# Patient Record
Sex: FEMALE | NOT HISPANIC OR LATINO
[De-identification: names, ages, dates, MRNs, and addresses within clinical notes are randomized per-mention and may not be internally consistent; named-entity substitution may affect disease eponyms.]

---

## 2017-06-14 PROBLEM — Z00.00 ENCOUNTER FOR PREVENTIVE HEALTH EXAMINATION: Status: ACTIVE | Noted: 2017-06-14

## 2017-06-19 ENCOUNTER — FORM ENCOUNTER (OUTPATIENT)
Age: 62
End: 2017-06-19

## 2017-06-20 ENCOUNTER — APPOINTMENT (OUTPATIENT)
Dept: RHEUMATOLOGY | Facility: CLINIC | Age: 62
End: 2017-06-20

## 2017-06-20 ENCOUNTER — OUTPATIENT (OUTPATIENT)
Dept: OUTPATIENT SERVICES | Facility: HOSPITAL | Age: 62
LOS: 1 days | End: 2017-06-20
Payer: COMMERCIAL

## 2017-06-20 ENCOUNTER — APPOINTMENT (OUTPATIENT)
Dept: RADIOLOGY | Facility: CLINIC | Age: 62
End: 2017-06-20

## 2017-06-20 ENCOUNTER — APPOINTMENT (OUTPATIENT)
Dept: ENDOCRINOLOGY | Facility: CLINIC | Age: 62
End: 2017-06-20

## 2017-06-20 VITALS
HEIGHT: 66 IN | TEMPERATURE: 97.9 F | HEART RATE: 97 BPM | BODY MASS INDEX: 22.5 KG/M2 | DIASTOLIC BLOOD PRESSURE: 72 MMHG | SYSTOLIC BLOOD PRESSURE: 111 MMHG | WEIGHT: 140 LBS | OXYGEN SATURATION: 98 %

## 2017-06-20 DIAGNOSIS — R29.890 LOSS OF HEIGHT: ICD-10-CM

## 2017-06-20 DIAGNOSIS — Z82.61 FAMILY HISTORY OF ARTHRITIS: ICD-10-CM

## 2017-06-20 DIAGNOSIS — Z98.890 OTHER SPECIFIED POSTPROCEDURAL STATES: ICD-10-CM

## 2017-06-20 DIAGNOSIS — M25.50 PAIN IN UNSPECIFIED JOINT: ICD-10-CM

## 2017-06-20 PROCEDURE — 73564 X-RAY EXAM KNEE 4 OR MORE: CPT

## 2017-06-20 PROCEDURE — 72070 X-RAY EXAM THORAC SPINE 2VWS: CPT

## 2017-06-27 PROBLEM — Z98.890 HISTORY OF PELVIC SURGERY: Status: RESOLVED | Noted: 2017-06-20 | Resolved: 2017-06-27

## 2017-06-27 PROBLEM — Z82.61 FAMILY HISTORY OF ARTHRITIS: Status: ACTIVE | Noted: 2017-06-20

## 2017-07-11 ENCOUNTER — APPOINTMENT (OUTPATIENT)
Dept: RHEUMATOLOGY | Facility: CLINIC | Age: 62
End: 2017-07-11

## 2017-07-11 VITALS
BODY MASS INDEX: 24.98 KG/M2 | SYSTOLIC BLOOD PRESSURE: 110 MMHG | WEIGHT: 141 LBS | HEIGHT: 63 IN | TEMPERATURE: 97.8 F | OXYGEN SATURATION: 93 % | DIASTOLIC BLOOD PRESSURE: 70 MMHG | HEART RATE: 79 BPM

## 2017-07-25 LAB
25(OH)D3 SERPL-MCNC: 34 NG/ML
ALBUMIN SERPL ELPH-MCNC: 4.2 G/DL
ALP BLD-CCNC: 52 U/L
ALT SERPL-CCNC: 17 U/L
ANION GAP SERPL CALC-SCNC: 15 MMOL/L
AST SERPL-CCNC: 18 U/L
BASOPHILS # BLD AUTO: 0.01 K/UL
BASOPHILS NFR BLD AUTO: 0.2 %
BILIRUB SERPL-MCNC: 0.2 MG/DL
BUN SERPL-MCNC: 13 MG/DL
CALCIUM SERPL-MCNC: 9.3 MG/DL
CHLORIDE SERPL-SCNC: 104 MMOL/L
CO2 SERPL-SCNC: 24 MMOL/L
CREAT SERPL-MCNC: 0.56 MG/DL
EOSINOPHIL # BLD AUTO: 0.06 K/UL
EOSINOPHIL NFR BLD AUTO: 0.9 %
GLUCOSE SERPL-MCNC: 85 MG/DL
HBA1C MFR BLD HPLC: 5.4 %
HCT VFR BLD CALC: 38.5 %
HGB BLD-MCNC: 12.4 G/DL
IMM GRANULOCYTES NFR BLD AUTO: 0.2 %
LYMPHOCYTES # BLD AUTO: 1.9 K/UL
LYMPHOCYTES NFR BLD AUTO: 29.1 %
MAGNESIUM SERPL-MCNC: 2.3 MG/DL
MAN DIFF?: NORMAL
MCHC RBC-ENTMCNC: 29.2 PG
MCHC RBC-ENTMCNC: 32.2 GM/DL
MCV RBC AUTO: 90.8 FL
MONOCYTES # BLD AUTO: 0.37 K/UL
MONOCYTES NFR BLD AUTO: 5.7 %
NEUTROPHILS # BLD AUTO: 4.17 K/UL
NEUTROPHILS NFR BLD AUTO: 63.9 %
PHOSPHATE SERPL-MCNC: 3.9 MG/DL
PLATELET # BLD AUTO: 207 K/UL
POTASSIUM SERPL-SCNC: 4.3 MMOL/L
PROT SERPL-MCNC: 7.3 G/DL
RBC # BLD: 4.24 M/UL
RBC # FLD: 12.8 %
SODIUM SERPL-SCNC: 143 MMOL/L
WBC # FLD AUTO: 6.52 K/UL

## 2017-07-26 ENCOUNTER — OUTPATIENT (OUTPATIENT)
Dept: OUTPATIENT SERVICES | Facility: HOSPITAL | Age: 62
LOS: 1 days | End: 2017-07-26
Payer: COMMERCIAL

## 2017-07-26 ENCOUNTER — APPOINTMENT (OUTPATIENT)
Dept: RADIOLOGY | Facility: IMAGING CENTER | Age: 62
End: 2017-07-26

## 2017-07-26 DIAGNOSIS — Z00.8 ENCOUNTER FOR OTHER GENERAL EXAMINATION: ICD-10-CM

## 2017-07-26 PROCEDURE — 71046 X-RAY EXAM CHEST 2 VIEWS: CPT

## 2017-07-26 PROCEDURE — 71020: CPT | Mod: 26

## 2017-10-18 ENCOUNTER — APPOINTMENT (OUTPATIENT)
Dept: VASCULAR SURGERY | Facility: CLINIC | Age: 62
End: 2017-10-18

## 2018-01-11 ENCOUNTER — APPOINTMENT (OUTPATIENT)
Dept: DERMATOLOGY | Facility: CLINIC | Age: 63
End: 2018-01-11
Payer: COMMERCIAL

## 2018-01-11 VITALS
BODY MASS INDEX: 23.87 KG/M2 | HEIGHT: 65.5 IN | SYSTOLIC BLOOD PRESSURE: 110 MMHG | WEIGHT: 145 LBS | DIASTOLIC BLOOD PRESSURE: 76 MMHG

## 2018-01-11 DIAGNOSIS — L65.9 NONSCARRING HAIR LOSS, UNSPECIFIED: ICD-10-CM

## 2018-01-11 PROCEDURE — 99202 OFFICE O/P NEW SF 15 MIN: CPT

## 2018-01-12 LAB — TSH SERPL-ACNC: 0.55 UIU/ML

## 2019-09-16 ENCOUNTER — APPOINTMENT (OUTPATIENT)
Dept: VASCULAR SURGERY | Facility: CLINIC | Age: 64
End: 2019-09-16

## 2019-11-26 ENCOUNTER — APPOINTMENT (OUTPATIENT)
Dept: ORTHOPEDIC SURGERY | Facility: CLINIC | Age: 64
End: 2019-11-26

## 2020-07-16 ENCOUNTER — APPOINTMENT (OUTPATIENT)
Dept: ORTHOPEDIC SURGERY | Facility: CLINIC | Age: 65
End: 2020-07-16

## 2021-07-08 ENCOUNTER — EMERGENCY (EMERGENCY)
Facility: HOSPITAL | Age: 66
LOS: 1 days | Discharge: HOME | End: 2021-07-08
Attending: STUDENT IN AN ORGANIZED HEALTH CARE EDUCATION/TRAINING PROGRAM
Payer: SELF-PAY

## 2021-07-08 VITALS
SYSTOLIC BLOOD PRESSURE: 138 MMHG | OXYGEN SATURATION: 99 % | DIASTOLIC BLOOD PRESSURE: 85 MMHG | HEART RATE: 68 BPM | TEMPERATURE: 98 F | RESPIRATION RATE: 18 BRPM

## 2021-07-08 VITALS
TEMPERATURE: 98 F | HEIGHT: 65 IN | HEART RATE: 80 BPM | DIASTOLIC BLOOD PRESSURE: 86 MMHG | WEIGHT: 156.97 LBS | SYSTOLIC BLOOD PRESSURE: 142 MMHG | OXYGEN SATURATION: 97 % | RESPIRATION RATE: 16 BRPM

## 2021-07-08 PROCEDURE — 99284 EMERGENCY DEPT VISIT MOD MDM: CPT

## 2021-07-08 PROCEDURE — 70450 CT HEAD/BRAIN W/O DYE: CPT

## 2021-07-08 PROCEDURE — 99284 EMERGENCY DEPT VISIT MOD MDM: CPT | Mod: 25

## 2021-07-08 PROCEDURE — 70450 CT HEAD/BRAIN W/O DYE: CPT | Mod: 26,MA

## 2021-07-08 RX ORDER — IBUPROFEN 200 MG
400 TABLET ORAL ONCE
Refills: 0 | Status: COMPLETED | OUTPATIENT
Start: 2021-07-08 | End: 2021-07-08

## 2021-07-08 RX ADMIN — Medication 400 MILLIGRAM(S): at 19:40

## 2021-07-08 NOTE — ED PROVIDER NOTE - NSFOLLOWUPCLINICS_GEN_ALL_ED_FT
[facility] General Internal Medicine  General Internal Medicine  [address] [id number]  Phone: [phone]  Fax:   Follow Up Time: 1-3 Days

## 2021-07-08 NOTE — ED PROVIDER NOTE - PATIENT PORTAL LINK FT
No
You can access the FollowMyHealth Patient Portal offered by [facility] by registering at the following website: [email or web address]. By joining [employer]’s FollowMyHealth portal, you will also be able to view your health information using other applications (apps) compatible with our system.

## 2021-07-08 NOTE — ED PROVIDER NOTE - NSFOLLOWUPINSTRUCTIONS_ED_ALL_ED_FT
Headache    A headache is pain or discomfort felt around the head or neck area. The specific cause of a headache may not be found as there are many types including tension headaches, migraine headaches, and cluster headaches. Watch your condition for any changes. Things you can do to manage your pain include taking over the counter and prescription medications as instructed by your health care provider, lying down in a dark quiet room, limiting stress, getting regular sleep, and refraining from alcohol and tobacco products.    SEEK IMMEDIATE MEDICAL CARE IF YOU HAVE ANY OF THE FOLLOWING SYMPTOMS: fever, vomiting, stiff neck, loss of vision, problems with speech, muscle weakness, loss of balance, trouble walking, passing out, or confusion. Headache    A headache is pain or discomfort felt around the head or neck area. The specific cause of a headache may not be found as there are many types including tension headaches, migraine headaches, and cluster headaches. Watch your condition for any changes. Things you can do to manage your pain include taking over the counter and prescription medications as instructed by your health care provider, lying down in a dark quiet room, limiting stress, getting regular sleep, and refraining from alcohol and tobacco products.    SEEK IMMEDIATE MEDICAL CARE IF YOU HAVE ANY OF THE FOLLOWING SYMPTOMS: fever, vomiting, stiff neck, loss of vision, problems with speech, muscle weakness, loss of balance, trouble walking, passing out, or confusion.    Follow up with a primary care physician within the next 2-3 days.    Please take over the counter pain medications such as motrin (400 mg every 6 hours) and tylenol (500 mg every 4 hours) for pain

## 2021-07-08 NOTE — ED PROVIDER NOTE - PROGRESS NOTE DETAILS
Head CT unremarkable. 400 mg ibuprofen in ED. Discharge home. DO Jones (PGY-1) pettet- Patient feels well, tolerating PO. Discussed radiology findings with patient. Patient feels comfortable going home. Gave home care and follow up instructions. Discussed which symptoms to look out for and when to return to the ED for further evaluation. Patient given opportunity to ask questions about their medical condition and had all questions answered.

## 2021-07-08 NOTE — ED ADULT NURSE NOTE - OBJECTIVE STATEMENT
65 y/o female presenting to ED for mechanical trip and fall yesterday. Pt reports striking head on concrete, questionable LOC. Pt denies blood thinners. On exam pt AOx4 breathing even unlabored spontaneously, NAD, 3mm PERRLA, gross neuro intact, ambulates without difficulty.

## 2021-07-08 NOTE — ED PROVIDER NOTE - PHYSICAL EXAMINATION
gen: well appearing  Mentation: AAO x 3  psych: mood appropriate  ENT: airway patent  Eyes: conjunctivae clear bilaterally  Cardio: RRR, no m/r/g  Resp: normal BS b/l  GI: s/nt/nd  : no CVA tenderness  Neuro: sensation and motor function intact, CN 2-12 intact, negative romberg, no dysmetria, no nystagmus  Skin: No evidence of rash  MSK: normal movement of all extremities  Lymph/Vasc: no LE edema

## 2021-07-08 NOTE — ED PROVIDER NOTE - NS ED ROS FT
CONSTITUTIONAL: No fevers, no chills, no lightheadedness, no dizziness  Eyes: no visual changes  Ears: no ear drainage, no ear pain  Nose: no nasal congestion  Mouth/Throat: no sore throat  CV: No chest pain, no palpitations  PULM: No SOB, no cough  GI: No n/v/d, no abd pain  : no dysuria, no hematuria  SKIN: no rashes.  NEURO: + Headache, no focal weakness or numbness  LYMPH/VASC: no LE swelling

## 2021-07-08 NOTE — ED PROVIDER NOTE - ATTENDING CONTRIBUTION TO CARE
I, Dre Childs, performed a history and physical exam of the patient and discussed their management with the resident and/or advanced care provider. I reviewed the resident and/or advanced care provider's note and agree with the documented findings and plan of care. I was present and available for all procedures.    66 year old female presents with head injury secondary to mechanical fall. Yesterday around 1pm, she tripped, fell on concrete, and hit the left frontal region of her head. She had a small abrasion with minimal bleeding. Denies other recent trauma, fevers, chills, headache, dizziness, nausea, vomiting, dysuria, freq, hematuria, diarrhea, constipation, chest pain, shortness of breath, cough.    Well appearing and in NAD, head normal appearing atraumatic, trachea midline, no respiratory distress, lungs cta bilaterally, rrr no murmurs, soft NT ND abdomen, no visible extremity deformities, Alert and oriented, non focal neuro exam, skin warm and dry, normal affect and mood    67yo f low mech chi, mechanical isolated abrasion on scalp tetanus utd will get ct head r/o ich sx relief dc pmd Follow up possible concussion

## 2021-07-08 NOTE — ED PROVIDER NOTE - OBJECTIVE STATEMENT
66 year old female presents with head injury secondary to mechanical fall. Yesterday around 1pm, she tripped, fell on concrete, and hit the left frontal region of her head. She had a small laceration with minimal bleeding. Patient does not know whether she lost consciousness. She was on the ground for 2-3 minutes. She has a left sided headache and feels slightly unsteady when walking. Denies nausea, vomiting, visual changes. She took two, 500 mg tylenol tablets for pain. 66 year old female presents with head injury secondary to mechanical fall. Yesterday around 1pm, she tripped, fell on concrete, and hit the left frontal region of her head. She had a small abrasion with minimal bleeding. Patient does not know whether she lost consciousness. She was on the ground for 2-3 minutes. She has a left sided headache and feels slightly unsteady when walking. Denies nausea, vomiting, visual changes. She took two, 500 mg tylenol tablets for pain.

## 2022-02-25 ENCOUNTER — EMERGENCY (EMERGENCY)
Facility: HOSPITAL | Age: 67
LOS: 1 days | Discharge: HOME | End: 2022-02-25
Attending: EMERGENCY MEDICINE | Admitting: EMERGENCY MEDICINE
Payer: COMMERCIAL

## 2022-02-25 VITALS
HEART RATE: 90 BPM | DIASTOLIC BLOOD PRESSURE: 80 MMHG | SYSTOLIC BLOOD PRESSURE: 134 MMHG | OXYGEN SATURATION: 100 % | HEIGHT: 65 IN | RESPIRATION RATE: 16 BRPM | TEMPERATURE: 98 F

## 2022-02-25 VITALS
HEART RATE: 92 BPM | SYSTOLIC BLOOD PRESSURE: 128 MMHG | TEMPERATURE: 98 F | DIASTOLIC BLOOD PRESSURE: 76 MMHG | RESPIRATION RATE: 16 BRPM | OXYGEN SATURATION: 100 %

## 2022-02-25 PROCEDURE — 99283 EMERGENCY DEPT VISIT LOW MDM: CPT

## 2022-02-25 RX ORDER — ACETAMINOPHEN 500 MG
975 TABLET ORAL ONCE
Refills: 0 | Status: COMPLETED | OUTPATIENT
Start: 2022-02-25 | End: 2022-02-25

## 2022-02-25 RX ADMIN — Medication 975 MILLIGRAM(S): at 15:42

## 2022-02-25 RX ADMIN — Medication 1 TABLET(S): at 18:14

## 2022-02-25 NOTE — ED PROVIDER NOTE - INTERNATIONAL TRAVEL
No

## 2022-02-25 NOTE — ED PROVIDER NOTE - NSFOLLOWUPINSTRUCTIONS_ED_ALL_ED_FT
FOLLOW UP WITH DENTAL AS DISCUSSED - PHONE NUMBER     TAKE AUGMENTIN 875/125MG ORALLY EVERY 12 HOURS FOR 1 WEEK    TYLENOL 650MG ORALLY EVERY 6 HOURS FOR PAIN.      RETURN TO EMERGENCY DEPARTMENT FOR NEW OR WORSENING SYMPTOMS.

## 2022-02-25 NOTE — ED PROVIDER NOTE - PATIENT PORTAL LINK FT
You can access the FollowMyHealth Patient Portal offered by [facility] by registering at the following website: [email or web address]. By joining [employer]’s FollowMyHealth portal, you will also be able to view your health information using other applications (apps) compatible with our system.

## 2022-02-25 NOTE — ED PROVIDER NOTE - NSICDXPASTSURGICALHX_GEN_ALL_CORE_FT
PAST SURGICAL HISTORY:  H/O tooth extraction     History of orthopedic surgery RLE fracture repair

## 2022-02-25 NOTE — ED ADULT NURSE NOTE - OBJECTIVE STATEMENT
received pt in intake room 3, 66 yr/o female A+Ox4, ambulatory at baseline. presented to the ED C/O right jaw/mouth pain that started on 2/22. pt states she has a hx of dental implants and believes part of her implant has broken. pt states that she was unable to get an appt with her dentist for 3 months, pain was worsening today which prompted her to come to the ED. no edema noted, area appears clean dry and intact. no breathing obstruction noted. medications given as ordered. will continue to monitor.

## 2022-02-25 NOTE — ED PROVIDER NOTE - PHYSICAL EXAMINATION
VS:  unremarkable    GEN - mild distress tooth pain;   well appearing;   A+O x3   HEAD - NC/AT     ENT - PEERL, EOMI, mucous membranes    moist , no discharge    Pt has upper and lower dentures.  Face not swollen or red.  Under lower full denture plate, 2 posts on L jaw intact, Mild swelling and redness at a post implant site (now missing) anterior R lower jaw, 2-3mm of jawbone visible, mild reddening around that, no purulence.  NECK: Neck supple, non-tender without lymphadenopathy, no masses, no JVD  PULM - CTA b/l,  symmetric breath sounds  COR -  normal heart sounds    ABD - , ND, NT, soft,  BACK - no CVA tenderness, nontender spine     EXTREMS - no edema, no deformity, warm and well perfused    SKIN - no rash    or bruising      NEUROLOGIC - alert, face symmetric, speech fluent, sensation nl, motor no focal deficit.

## 2022-02-25 NOTE — ED ADULT TRIAGE NOTE - CHIEF COMPLAINT QUOTE
Pt states that she has been having pain to jaw x few days, states that she thinks her implant is broken.  Pt denies PMH

## 2022-02-25 NOTE — ED PROVIDER NOTE - PROGRESS NOTE DETAILS
DD ED ATTG:  dental saw pt no intervention rx augmentin follow up with dental within 1 week.  Dental clinic info provided.

## 2022-02-25 NOTE — ED ADULT NURSE NOTE - NSFALLRSKASSESSDT_ED_ALL_ED
TC to [facility] for [employer]. He stated that the pt is doing well and showing signs of improvement. He stated that the pt's appetite has improved and he is not requiring O2 during the day. The pt continues to take the nebulizer treatments. CM addressed ACP and was informed that the pt has a POA document in place. James Stallings is aware of pt's upcoming appointment with [facility] and will provide transportation. No new needs identified or reported at present. Plan:  CM will follow up next week. This note will not be viewable in [address]
25-Feb-2022 15:44

## 2022-02-25 NOTE — ED ADULT NURSE NOTE - NSIMPLEMENTINTERV_GEN_ALL_ED
Implemented All Universal Safety Interventions:  [place] to call system. Call bell, personal items and telephone within reach. Instruct patient to call for assistance. Room bathroom lighting operational. Non-slip footwear when patient is off stretcher. Physically safe environment: no spills, clutter or unnecessary equipment. Stretcher in lowest position, wheels locked, appropriate side rails in place.

## 2022-02-25 NOTE — ED PROVIDER NOTE - OBJECTIVE STATEMENT
66F p/w R lower anterior jaw pain x 3-4 days.  Pt reports a history of broken jaw and she had surgery and lower teeth removal and implant post placed.  Pt says surgery was a few years ago, implant placement a few years ago, implant post fell out a few years ago also, and she hasn't had it addressed.  Pt says the area is tender to touch, worse with eating, and couldn't sleep due to the pain.  Pt denies facial swelling or difficulty breathing.  Pt reports had trouble getting a dental appointment, next available was 1-2 months away.  Pt says this happened before and she was told it was infected and got abx.  PMHX denies No T.  No meds  PSHX jaw surgery, RLE fracture surgery.  NKA.  Mild swelling and redness at a post implant site, some jaw visible, mild reddening around that, no purulence.  Dental paged at 338pm, they will see.    VS:  unremarkable    GEN - mild distress tooth pain;   well appearing;   A+O x3   HEAD - NC/AT     ENT - PEERL, EOMI, mucous membranes    moist , no discharge    Pt has upper and lower dentures.  Face not swollen or red.  Under lower full denture plate, 2 posts on L jaw intact, Mild swelling and redness at a post implant site (now missing) anterior R lower jaw, 2-3mm of jawbone visible, mild reddening around that, no purulence.  NECK: Neck supple, non-tender without lymphadenopathy, no masses, no JVD  PULM - CTA b/l,  symmetric breath sounds  COR -  normal heart sounds    ABD - , ND, NT, soft,  BACK - no CVA tenderness, nontender spine     EXTREMS - no edema, no deformity, warm and well perfused    SKIN - no rash    or bruising      NEUROLOGIC - alert, face symmetric, speech fluent, sensation nl, motor no focal deficit.

## 2022-02-25 NOTE — ED PROVIDER NOTE - CLINICAL SUMMARY MEDICAL DECISION MAKING FREE TEXT BOX
66F p/w R lower anterior jaw pain x 3-4 days.  Pt reports a history of broken jaw and she had surgery and lower teeth removal and implant post placed.  Pt says surgery was a few years ago, implant placement a few years ago, implant post fell out a few years ago also, and she hasn't had it addressed.  Pt says the area is tender to touch, worse with eating, and couldn't sleep due to the pain.  Pt denies facial swelling or difficulty breathing.  Pt reports had trouble getting a dental appointment, next available was 1-2 months away.  Pt says this happened before and she was told it was infected and got abx.  PMHX denies No T.  No meds  PSHX jaw surgery, RLE fracture surgery.  NKA.  Mild swelling and redness at a post implant site, some jaw visible, mild reddening around that, no purulence.  Dental paged at 338pm, they will see.

## 2022-02-25 NOTE — PROGRESS NOTE ADULT - SUBJECTIVE AND OBJECTIVE BOX
CC: 67 y/o presents with pain in the lower right with no associated facial swelling.    HPI: 66F p/w R lower anterior jaw pain x 3-4 days.  Pt reports a history of broken jaw and she had surgery and lower teeth removal and implant post placed.  Pt says surgery was a few years ago, implant placement a few years ago, implant post fell out a few years ago also, and she hasn't had it addressed.  Pt says the area is tender to touch, worse with eating, and couldn't sleep due to the pain.  Pt denies facial swelling or difficulty breathing.  Pt reports had trouble getting a dental appointment, next available was 1-2 months away.  Pt says this happened before and she was told it was infected and got abx.  PMHX denies No T.  No meds  PSHX jaw surgery, RLE fracture surgery.  NKA.  Mild swelling and redness at a post implant site, some jaw visible, mild reddening around that, no purulence.     Med HX:RT JAW PAIN    90+    SysAdmin_VisitLink        RX:    Social Hx: non-contributory    EOE: WNL   TMJ (WNL)  Trismus (-)  LAD (-)  Dysphagia (-)  Swelling (-)    IOE: Permanent dentition with multiple missing teeth and root tip. Implant post on lower left quad. Erythemic gingiva on ridge surrounding exposed bone/metal post on lower right.   Hard/Soft palate (WNL)  Tongue/Floor of Mouth (WNL)  Buccal Mucosa (WNL)  Percussion (-)  Palpation (+): Metal post site adjacent site #27.   Mobility (-)   Swelling (+): mild gingival swelling surrounding exposed post.     Radiographs: PA / PAN taken and evaluated. Noted root tip on upper left quad and exposed post/metal appliance on lower right.     Assessment: Exposed post/metal appliance on lower right causing erythemic gingiva on ridge.     Treatment: Discussed clinical and radiographic findings with patient. No treatment indiciated at this time due to no associated facial or gingival swelling, abscess present, or fistula present. Recommended patient be referred to either outpatient private dentist or [facility] adult dental for comprehensive dental care. All questions answered.     Discussed case with Dr. Auguste from Oral surgery. Recommended follow up with oral surgery during regular clinic hours.     Recommendations:   1. OTC pain medications as needed. ABX as per ED.   2. Seek comprehensive dental care with oral surgery [phone]  5. If any difficulty breathing/swallowing or fever and swelling occur, return to ED.    Ana Self DDS #[id number]

## 2022-02-26 DIAGNOSIS — Z98.890 OTHER SPECIFIED POSTPROCEDURAL STATES: Chronic | ICD-10-CM

## 2022-02-26 DIAGNOSIS — K08.409 PARTIAL LOSS OF TEETH, UNSPECIFIED CAUSE, UNSPECIFIED CLASS: Chronic | ICD-10-CM

## 2022-02-26 NOTE — ED POST DISCHARGE NOTE - RESULT SUMMARY
MELA Holm: Pt called asking for abx to be sent to pharmacy. DC instructions mention Augmentin BID X 7 days, but rx not sent. RX sent to [facility] in [place]

## 2022-05-24 ENCOUNTER — EMERGENCY (EMERGENCY)
Facility: HOSPITAL | Age: 67
LOS: 1 days | Discharge: HOME | End: 2022-05-24
Attending: EMERGENCY MEDICINE | Admitting: EMERGENCY MEDICINE
Payer: COMMERCIAL

## 2022-05-24 VITALS
DIASTOLIC BLOOD PRESSURE: 75 MMHG | TEMPERATURE: 97 F | HEIGHT: 65 IN | SYSTOLIC BLOOD PRESSURE: 161 MMHG | OXYGEN SATURATION: 100 % | HEART RATE: 80 BPM | RESPIRATION RATE: 16 BRPM

## 2022-05-24 VITALS
HEART RATE: 72 BPM | SYSTOLIC BLOOD PRESSURE: 124 MMHG | RESPIRATION RATE: 14 BRPM | OXYGEN SATURATION: 100 % | DIASTOLIC BLOOD PRESSURE: 76 MMHG

## 2022-05-24 DIAGNOSIS — Z98.890 OTHER SPECIFIED POSTPROCEDURAL STATES: Chronic | ICD-10-CM

## 2022-05-24 DIAGNOSIS — K08.409 PARTIAL LOSS OF TEETH, UNSPECIFIED CAUSE, UNSPECIFIED CLASS: Chronic | ICD-10-CM

## 2022-05-24 PROBLEM — Z78.9 OTHER SPECIFIED HEALTH STATUS: Chronic | Status: ACTIVE | Noted: 2022-02-26

## 2022-05-24 PROCEDURE — 99284 EMERGENCY DEPT VISIT MOD MDM: CPT

## 2022-05-24 PROCEDURE — 70450 CT HEAD/BRAIN W/O DYE: CPT | Mod: 26,MG

## 2022-05-24 PROCEDURE — G1004: CPT

## 2022-05-24 RX ORDER — ONDANSETRON 8 MG/1
1 TABLET, FILM COATED ORAL
Qty: 15 | Refills: 0
Start: 2022-05-24

## 2022-05-24 RX ORDER — METOCLOPRAMIDE HCL 10 MG
10 TABLET ORAL ONCE
Refills: 0 | Status: DISCONTINUED | OUTPATIENT
Start: 2022-05-24 | End: 2022-05-24

## 2022-05-24 RX ORDER — ONDANSETRON 8 MG/1
4 TABLET, FILM COATED ORAL ONCE
Refills: 0 | Status: COMPLETED | OUTPATIENT
Start: 2022-05-24 | End: 2022-05-24

## 2022-05-24 RX ADMIN — ONDANSETRON 4 MILLIGRAM(S): 8 TABLET, FILM COATED ORAL at 16:29

## 2022-05-24 NOTE — ED PROVIDER NOTE - NSFOLLOWUPINSTRUCTIONS_ED_ALL_ED_FT
FOLLOW UP WITH YOUR  DOCTOR WITHIN 1 WEEK  BRING THE COPIES OF YOUR RESULTS WITH YOU (PROVIDED)  CAN TAKE TYLENOL 650MG ORALLY EVERY 6 HOURS FOR PAIN OR FEVER.  RETURN TO ED FOR NEW OR WORSENING SYMPTOMS.

## 2022-05-24 NOTE — ED PROVIDER NOTE - PATIENT PORTAL LINK FT
Outpatient Infusion Center Short Visit Progress Note    9152 Patient [facility] infusion ambulatory in stable condition. Assessment completed. No new concerns voiced. Vital Signs:  Visit Vitals  /66   Pulse 62   Temp 99.4 °F (37.4 °C)   Resp 18   LMP 01/01/1996   SpO2 96%         Peripheral IV (24g) inserted into left wrist  with positive blood return. Medications:  Medications Administered     ferric carboxymaltose (INJECTAFER) 750 mg in 0.9% sodium chloride 250 mL, overfill volume 25 mL IVPB     Admin Date  11/13/2020 Action  Given Dose  750 mg Rate  870 mL/hr Route  IntraVENous Administered By  Timo Bhat, RN          sodium chloride 0.9 % bolus infusion 500 mL     Admin Date  11/13/2020 Action  New Bag Dose  500 mL Route  IntraVENous Administered By  Timo Bhat, [address] Patient tolerated treatment well. Patient discharged from [address] [address] ambulatory in no distress.      Rahda Mensah RN    Future Appointments   Date Time Provider [facility]   2/3/2021 10:30 AM Edmond Azul MD [facility] BS AMB
You can access the FollowMyHealth Patient Portal offered by [facility] by registering at the following website: [email or web address]. By joining [employer]’s FollowMyHealth portal, you will also be able to view your health information using other applications (apps) compatible with our system.
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
## 2022-05-24 NOTE — ED PROVIDER NOTE - PHYSICAL EXAMINATION
Dr Guzmán  no photophobia. supple neck. no lac or abrasion noted on scalp. no ecchymosis of back/chest/abdomen.   nontender midline cervical/thoracic or lumbar spine. no step off  No resp distress. able to speak in full and clear sentences. no wheeze, rales or stridor.  AOx3, no focal deficits. CN 2-12 grossly intact. nl gait. no meningeal signs. nl tm bl. nontender mastoid bl. EOMI. PERRLA.   soft nontender abdomen. no  rebound. no guarding. no sign of trauma. no CVAT  no pedal edema. no calf tenderness. normal pulses bilateral feet.
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
## 2022-06-02 ENCOUNTER — APPOINTMENT (OUTPATIENT)
Dept: PHYSICAL MEDICINE AND REHAB | Facility: CLINIC | Age: 67
End: 2022-06-02
Payer: OTHER MISCELLANEOUS

## 2022-06-02 VITALS
BODY MASS INDEX: 25.99 KG/M2 | WEIGHT: 156 LBS | OXYGEN SATURATION: 100 % | DIASTOLIC BLOOD PRESSURE: 88 MMHG | HEIGHT: 65 IN | HEART RATE: 87 BPM | SYSTOLIC BLOOD PRESSURE: 136 MMHG

## 2022-06-02 DIAGNOSIS — M54.16 RADICULOPATHY, LUMBAR REGION: ICD-10-CM

## 2022-06-02 DIAGNOSIS — M67.959 UNSPECIFIED DISORDER OF SYNOVIUM AND TENDON, UNSPECIFIED THIGH: ICD-10-CM

## 2022-06-02 DIAGNOSIS — R29.898 OTHER SYMPTOMS AND SIGNS INVOLVING THE MUSCULOSKELETAL SYSTEM: ICD-10-CM

## 2022-06-02 DIAGNOSIS — M54.17 RADICULOPATHY, LUMBOSACRAL REGION: ICD-10-CM

## 2022-06-02 DIAGNOSIS — R51.9 HEADACHE, UNSPECIFIED: ICD-10-CM

## 2022-06-02 PROCEDURE — 99205 OFFICE O/P NEW HI 60 MIN: CPT

## 2022-06-02 RX ORDER — KETOCONAZOLE 20.5 MG/ML
2 SHAMPOO, SUSPENSION TOPICAL
Qty: 120 | Refills: 4 | Status: DISCONTINUED | COMMUNITY
Start: 2018-01-11 | End: 2022-06-02

## 2022-06-02 RX ORDER — METHOCARBAMOL 750 MG/1
750 TABLET, FILM COATED ORAL AT BEDTIME
Qty: 60 | Refills: 0 | Status: ACTIVE | COMMUNITY
Start: 2022-06-02 | End: 1900-01-01

## 2022-06-02 NOTE — REASON FOR VISIT
[Initial Consultation] : an initial consultation [Head Injury Evaluation] : a head injury evaluation

## 2022-06-03 NOTE — ADDENDUM
[FreeTextEntry1] : imaging report: \par INTERPRETATION: CLINICAL INFORMATION: Headache. Hit back of head status post fall.\par TECHNIQUE: Noncontrast axial CT images were acquired of the head. Two-dimensional sagittal and coronal reformats were generated.\par COMPARISON STUDY: No similar prior comparisons available.\par FINDINGS:\par There is no evidence of an acute intracranial hemorrhage, extra-axial collection, mass effect, or midline shift. The basal cisterns are patent. No hydrocephalus.\par There is mild cerebral volume loss with proportional prominence of the sulci and ventricles. Nonspecific white matter hypoattenuation, likely related to chronic microvascular changes.\par The visualized paranasal sinuses and mastoid air cells are clear.\par The globes are symmetric in size and contour.\par No displaced calvarial fracture.\par IMPRESSION:\par No evidence of acute intracranial hemorrhage, midline shift or displaced calvarial fracture.

## 2022-06-03 NOTE — PHYSICAL EXAM
[Person] : Oriented to self [Place] : Oriented to place [Time] : Oriented to time [Span Intact] : Attention is normal [Concentration Intact] : Concentration is normal [Fluency Intact] : Fluent [Comprehension] : Comprehension is intact [Vestibular Ocular Reflex Normal] : Vestibular ocular reflex normal [Cervical Tenderness] : Cervical tenderness is present [Normal] : There is no dysmetria. [Tandem Stance] : Tandem stance is normal [Double Stance] : Double stance is normal [Short Term Intact] : Short term memory is impaired [Saccades] : No saccades [Symptoms with Head Turns on Fixed Point] : No symptoms present with head turns on a fixed point [Nystagmus] : No nystagmus [Symptoms with Head Turns and Ambulation] : No symptoms present with head turns and ambulation [Full Cervical ROM] : Full cervical ROM is limited [Single Stance] : Single stance is abnormal [de-identified] : tongue midline [de-identified] : apple chair tree then door: she remember apple, chair, tree could not recall [de-identified] : limited in all planes with active ROM;  [de-identified] : left elbow flexion 4+/5, left wrist extension 4+/5, left thumb opposition 4+/5 [de-identified] : 1+ BUE, BLE [de-identified] : slight diminished sensation to light touch, but not pinprick over the left median nerve distribution in the hand [de-identified] : [place] symmetricm FN symmetrically slowed, some terminal titubation during prolonged testing and with horizonatl movement but not in a specfici direction, HS intact b/l limited due to habitus and lower limb swelling [de-identified] : single leg stance not attempted due to balance, able to walk on toes and on heels briefly [de-identified] : left hip flexion 4+/5 right hip flexion 5-/5 otherwise 5-/5 symmetric BLE
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
## 2022-06-14 ENCOUNTER — APPOINTMENT (OUTPATIENT)
Dept: NEUROLOGY | Facility: CLINIC | Age: 67
End: 2022-06-14
Payer: COMMERCIAL

## 2022-06-14 VITALS
HEART RATE: 89 BPM | BODY MASS INDEX: 26.66 KG/M2 | WEIGHT: 160 LBS | DIASTOLIC BLOOD PRESSURE: 83 MMHG | HEIGHT: 65 IN | SYSTOLIC BLOOD PRESSURE: 150 MMHG

## 2022-06-14 PROCEDURE — 99205 OFFICE O/P NEW HI 60 MIN: CPT

## 2022-06-14 NOTE — PHYSICAL EXAM
[General Appearance - Alert] : alert [General Appearance - In No Acute Distress] : in no acute distress [Oriented To Time, Place, And Person] : oriented to person, place, and time [Impaired Insight] : insight and judgment were intact [Affect] : the affect was normal [Person] : oriented to person [Place] : oriented to place [Time] : oriented to time [Short Term Intact] : short term memory intact [Remote Intact] : remote memory intact [Registration Intact] : recent registration memory intact [Span Intact] : the attention span was normal [Concentration Intact] : normal concentrating ability [Visual Intact] : visual attention was ~T not ~L decreased [Naming Objects] : no difficulty naming common objects [Repeating Phrases] : no difficulty repeating a phrase [Writing A Sentence] : no difficulty writing a sentence [Fluency] : fluency intact [Comprehension] : comprehension intact [Reading] : reading intact [Current Events] : adequate knowledge of current events [Past History] : adequate knowledge of personal past history [Vocabulary] : adequate range of vocabulary [Cranial Nerves Optic (II)] : visual acuity intact bilaterally,  visual fields full to confrontation, pupils equal round and reactive to light [Cranial Nerves Oculomotor (III)] : extraocular motion intact [Cranial Nerves Trigeminal (V)] : facial sensation intact symmetrically [Cranial Nerves Facial (VII)] : face symmetrical [Cranial Nerves Vestibulocochlear (VIII)] : hearing was intact bilaterally [Cranial Nerves Glossopharyngeal (IX)] : tongue and palate midline [Cranial Nerves Accessory (XI - Cranial And Spinal)] : head turning and shoulder shrug symmetric [Cranial Nerves Hypoglossal (XII)] : there was no tongue deviation with protrusion [Motor Strength] : muscle strength was normal in all four extremities [Involuntary Movements] : no involuntary movements were seen [No Muscle Atrophy] : normal bulk in all four extremities [Motor Handedness Right-Handed] : the patient is right hand dominant [Sensation Tactile Decrease] : light touch was intact [Balance] : balance was intact [2+] : Ankle jerk left 2+ [Sclera] : the sclera and conjunctiva were normal [PERRL With Normal Accommodation] : pupils were equal in size, round, reactive to light, with normal accommodation [Extraocular Movements] : extraocular movements were intact [No APD] : no afferent pupillary defect [No DANGELO] : no internuclear ophthalmoplegia [Full Visual Field] : full visual field [Outer Ear] : the ears and nose were normal in appearance [Oropharynx] : the oropharynx was normal [Neck Appearance] : the appearance of the neck was normal [Neck Cervical Mass (___cm)] : no neck mass was observed [Jugular Venous Distention Increased] : there was no jugular-venous distention [Thyroid Diffuse Enlargement] : the thyroid was not enlarged [Thyroid Nodule] : there were no palpable thyroid nodules [Auscultation Breath Sounds / Voice Sounds] : lungs were clear to auscultation bilaterally [Heart Rate And Rhythm] : heart rate was normal and rhythm regular [Heart Sounds] : normal S1 and S2 [Heart Sounds Gallop] : no gallops [Murmurs] : no murmurs [Heart Sounds Pericardial Friction Rub] : no pericardial rub [Arterial Pulses Carotid] : carotid pulses were normal with no bruits [Full Pulse] : the pedal pulses are present [Edema] : there was no peripheral edema [Bowel Sounds] : normal bowel sounds [Abdomen Soft] : soft [Abdomen Tenderness] : non-tender [Abdomen Mass (___ Cm)] : no abdominal mass palpated [No CVA Tenderness] : no ~M costovertebral angle tenderness [No Spinal Tenderness] : no spinal tenderness [Abnormal Walk] : normal gait [Nail Clubbing] : no clubbing  or cyanosis of the fingernails [Musculoskeletal - Swelling] : no joint swelling seen [Motor Tone] : muscle strength and tone were normal [Skin Color & Pigmentation] : normal skin color and pigmentation [Skin Turgor] : normal skin turgor [] : no rash [Motor Strength Upper Extremities Bilaterally] : strength was normal in both upper extremities [Motor Strength Lower Extremities Bilaterally] : strength was normal in both lower extremities [Limited Balance] : balance was intact [Past-pointing] : there was no past-pointing [Tremor] : no tremor present [Plantar Reflex Right Only] : normal on the right [Plantar Reflex Left Only] : normal on the left [FreeTextEntry4] : Mildly slow processing, but intact attention and processing. \par -SAC: -1 (IM), -1 (C), -1(R): 27/30\par -mBESS: 2 errors at 1-leg stand [FreeTextEntry5] : COT ok; HOR/VOR ok; no skew; no corrective saccades. Head turning gait ok. [FreeTextEntry8] : good stride and speed, no shuffle, pivots in 3 steps; cautioned.  [FreeTextEntry1] : cannot visualize FO.
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
## 2022-06-28 ENCOUNTER — APPOINTMENT (OUTPATIENT)
Dept: PHYSICAL MEDICINE AND REHAB | Facility: CLINIC | Age: 67
End: 2022-06-28

## 2022-07-25 ENCOUNTER — APPOINTMENT (OUTPATIENT)
Dept: PHYSICAL MEDICINE AND REHAB | Facility: CLINIC | Age: 67
End: 2022-07-25
Payer: OTHER MISCELLANEOUS

## 2022-07-25 DIAGNOSIS — G47.9 SLEEP DISORDER, UNSPECIFIED: ICD-10-CM

## 2022-07-25 DIAGNOSIS — S10.93XA CONTUSION OF UNSPECIFIED PART OF NECK, INITIAL ENCOUNTER: ICD-10-CM

## 2022-07-25 PROCEDURE — 99215 OFFICE O/P EST HI 40 MIN: CPT

## 2022-07-25 RX ORDER — AMOXICILLIN AND CLAVULANATE POTASSIUM 875; 125 MG/1; MG/1
875-125 TABLET, COATED ORAL
Qty: 14 | Refills: 0 | Status: ACTIVE | COMMUNITY
Start: 2022-02-26

## 2022-07-25 RX ORDER — NAPROXEN 500 MG/1
500 TABLET ORAL
Qty: 60 | Refills: 0 | Status: ACTIVE | COMMUNITY
Start: 2022-07-25 | End: 1900-01-01

## 2022-07-25 NOTE — REASON FOR VISIT
[Follow-up Evaluation] : a follow-up evaluation [Head Injury Evaluation] : a head injury evaluation [Significant Other] : significant other
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
## 2022-07-25 NOTE — PHYSICAL EXAM
[Person] : Oriented to self [Place] : Oriented to place [Time] : Oriented to time [Span Intact] : Attention is normal [Concentration Intact] : Concentration is normal [Fluency Intact] : Fluent [Comprehension] : Comprehension is intact [Vestibular Ocular Reflex Normal] : Vestibular ocular reflex normal [Cervical Tenderness] : Cervical tenderness is present [Normal] : There is no dysmetria. [Tandem Stance] : Tandem stance is normal [Double Stance] : Double stance is normal [Short Term Intact] : Short term memory is impaired [Saccades] : No saccades [Symptoms with Head Turns on Fixed Point] : Symptoms with head turns on a fixed point [Nystagmus] : No nystagmus [Symptoms with Head Turns and Ambulation] : No symptoms present with head turns and ambulation [Full Cervical ROM] : Full cervical ROM is present [Single Stance] : Single stance is abnormal [de-identified] : apple chair tree then door: she remember apple, chair, tree could not recall [de-identified] : tongue midline [de-identified] : + romberg [de-identified] : limited in all planes with active ROM, pain during range of motion [de-identified] : left elbow flexion 4+/5, left wrist extension 4+/5, left thumb opposition 4+/5 [de-identified] : 1+ BUE, BLE [de-identified] : slight diminished sensation to light touch, but not pinprick over the left median nerve distribution in the hand [de-identified] : [place] symmetricm FN symmetrically slowed, some terminal titubation during prolonged testing and with horizonatl movement but not in a specfici direction, HS intact b/l limited due to habitus and lower limb swelling [de-identified] : single leg stance not attempted due to balance, able to walk on toes and on heels briefly [de-identified] : left hip flexion 4+/5 right hip flexion 5-/5 otherwise 5-/5 symmetric BLE
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
## 2022-07-30 ENCOUNTER — APPOINTMENT (OUTPATIENT)
Dept: CT IMAGING | Facility: IMAGING CENTER | Age: 67
End: 2022-07-30

## 2022-07-30 ENCOUNTER — OUTPATIENT (OUTPATIENT)
Dept: OUTPATIENT SERVICES | Facility: HOSPITAL | Age: 67
LOS: 1 days | End: 2022-07-30
Payer: COMMERCIAL

## 2022-07-30 DIAGNOSIS — T14.90XA INJURY, UNSPECIFIED, INITIAL ENCOUNTER: ICD-10-CM

## 2022-07-30 DIAGNOSIS — S16.1XXA STRAIN OF MUSCLE, FASCIA AND TENDON AT NECK LEVEL, INITIAL ENCOUNTER: ICD-10-CM

## 2022-07-30 DIAGNOSIS — K08.409 PARTIAL LOSS OF TEETH, UNSPECIFIED CAUSE, UNSPECIFIED CLASS: Chronic | ICD-10-CM

## 2022-07-30 DIAGNOSIS — R51.9 HEADACHE, UNSPECIFIED: ICD-10-CM

## 2022-07-30 DIAGNOSIS — Z98.890 OTHER SPECIFIED POSTPROCEDURAL STATES: Chronic | ICD-10-CM

## 2022-07-30 DIAGNOSIS — M62.838 OTHER MUSCLE SPASM: ICD-10-CM

## 2022-07-30 DIAGNOSIS — R26.89 OTHER ABNORMALITIES OF GAIT AND MOBILITY: ICD-10-CM

## 2022-07-30 PROCEDURE — 72125 CT NECK SPINE W/O DYE: CPT

## 2022-07-30 PROCEDURE — 70450 CT HEAD/BRAIN W/O DYE: CPT | Mod: 26

## 2022-07-30 PROCEDURE — 70450 CT HEAD/BRAIN W/O DYE: CPT

## 2022-07-30 PROCEDURE — 72125 CT NECK SPINE W/O DYE: CPT | Mod: 26

## 2022-08-09 ENCOUNTER — APPOINTMENT (OUTPATIENT)
Dept: PHYSICAL MEDICINE AND REHAB | Facility: CLINIC | Age: 67
End: 2022-08-09
Payer: OTHER MISCELLANEOUS

## 2022-08-09 DIAGNOSIS — R26.89 OTHER ABNORMALITIES OF GAIT AND MOBILITY: ICD-10-CM

## 2022-08-09 DIAGNOSIS — M62.838 OTHER MUSCLE SPASM: ICD-10-CM

## 2022-08-09 DIAGNOSIS — S06.0X9A CONCUSSION WITH LOSS OF CONSCIOUSNESS OF UNSPECIFIED DURATION, INITIAL ENCOUNTER: ICD-10-CM

## 2022-08-09 DIAGNOSIS — T14.90XA INJURY, UNSPECIFIED, INITIAL ENCOUNTER: ICD-10-CM

## 2022-08-09 DIAGNOSIS — S16.1XXA STRAIN OF MUSCLE, FASCIA AND TENDON AT NECK LEVEL, INITIAL ENCOUNTER: ICD-10-CM

## 2022-08-09 PROCEDURE — 99443: CPT

## 2023-03-13 ENCOUNTER — APPOINTMENT (OUTPATIENT)
Dept: MAMMOGRAPHY | Facility: CLINIC | Age: 68
End: 2023-03-13
Payer: COMMERCIAL

## 2023-03-13 ENCOUNTER — OUTPATIENT (OUTPATIENT)
Dept: OUTPATIENT SERVICES | Facility: HOSPITAL | Age: 68
LOS: 1 days | End: 2023-03-13
Payer: COMMERCIAL

## 2023-03-13 ENCOUNTER — APPOINTMENT (OUTPATIENT)
Dept: ULTRASOUND IMAGING | Facility: CLINIC | Age: 68
End: 2023-03-13
Payer: COMMERCIAL

## 2023-03-13 DIAGNOSIS — Z98.890 OTHER SPECIFIED POSTPROCEDURAL STATES: Chronic | ICD-10-CM

## 2023-03-13 DIAGNOSIS — K08.409 PARTIAL LOSS OF TEETH, UNSPECIFIED CAUSE, UNSPECIFIED CLASS: Chronic | ICD-10-CM

## 2023-03-13 DIAGNOSIS — Z12.31 ENCOUNTER FOR SCREENING MAMMOGRAM FOR MALIGNANT NEOPLASM OF BREAST: ICD-10-CM

## 2023-03-13 PROCEDURE — 77063 BREAST TOMOSYNTHESIS BI: CPT

## 2023-03-13 PROCEDURE — 77063 BREAST TOMOSYNTHESIS BI: CPT | Mod: 26

## 2023-03-13 PROCEDURE — 77067 SCR MAMMO BI INCL CAD: CPT | Mod: 26

## 2023-03-13 PROCEDURE — 77067 SCR MAMMO BI INCL CAD: CPT

## 2023-03-24 ENCOUNTER — APPOINTMENT (OUTPATIENT)
Dept: OPHTHALMOLOGY | Facility: CLINIC | Age: 68
End: 2023-03-24
Payer: COMMERCIAL

## 2023-03-24 ENCOUNTER — NON-APPOINTMENT (OUTPATIENT)
Age: 68
End: 2023-03-24

## 2023-03-24 PROCEDURE — 92020 GONIOSCOPY: CPT

## 2023-03-24 PROCEDURE — 92002 INTRM OPH EXAM NEW PATIENT: CPT

## 2023-04-18 ENCOUNTER — APPOINTMENT (OUTPATIENT)
Dept: MRI IMAGING | Facility: HOSPITAL | Age: 68
End: 2023-04-18

## 2023-06-27 ENCOUNTER — APPOINTMENT (OUTPATIENT)
Dept: NEUROLOGY | Facility: CLINIC | Age: 68
End: 2023-06-27

## 2023-10-10 ENCOUNTER — APPOINTMENT (OUTPATIENT)
Age: 68
End: 2023-10-10
Payer: COMMERCIAL

## 2023-10-10 PROCEDURE — 99212 OFFICE O/P EST SF 10 MIN: CPT

## 2023-10-30 ENCOUNTER — APPOINTMENT (OUTPATIENT)
Dept: PHYSICAL MEDICINE AND REHAB | Facility: CLINIC | Age: 68
End: 2023-10-30

## 2023-12-20 ENCOUNTER — APPOINTMENT (OUTPATIENT)
Age: 68
End: 2023-12-20
Payer: COMMERCIAL

## 2023-12-20 PROCEDURE — 99024 POSTOP FOLLOW-UP VISIT: CPT

## 2024-01-23 ENCOUNTER — APPOINTMENT (OUTPATIENT)
Age: 69
End: 2024-01-23
Payer: COMMERCIAL

## 2024-01-23 PROCEDURE — 99213 OFFICE O/P EST LOW 20 MIN: CPT

## 2024-01-30 ENCOUNTER — APPOINTMENT (OUTPATIENT)
Age: 69
End: 2024-01-30
Payer: SELF-PAY

## 2024-01-30 PROCEDURE — NTX: CUSTOM

## 2024-02-14 ENCOUNTER — APPOINTMENT (OUTPATIENT)
Age: 69
End: 2024-02-14
Payer: COMMERCIAL

## 2024-02-14 PROCEDURE — D6010: CPT

## 2024-02-23 ENCOUNTER — APPOINTMENT (OUTPATIENT)
Age: 69
End: 2024-02-23
Payer: COMMERCIAL

## 2024-02-23 ENCOUNTER — APPOINTMENT (OUTPATIENT)
Age: 69
End: 2024-02-23
Payer: SELF-PAY

## 2024-02-23 PROCEDURE — XXXXX: CPT

## 2024-02-23 PROCEDURE — 99024 POSTOP FOLLOW-UP VISIT: CPT

## 2024-02-27 ENCOUNTER — APPOINTMENT (OUTPATIENT)
Age: 69
End: 2024-02-27

## 2024-03-10 NOTE — ED ADULT TRIAGE NOTE - ACCOMPANIED BY
Hospital Medicine Consultation    Date of Service  3/10/2024    Referring Physician  JACQUELIN Siddiqui    Consulting Physician  Luis Vega D.O.    Reason for Consultation  Airway compromise    History of Presenting Illness  62 y.o. female who presented 3/9/2024 with history of coronary artery disease, dyslipidemia, hypertension, LIDYA, hypothyroidism, BMI 36.  Patient originally presented to [facility] on March 7.  She had been experiencing abdominal pain, and workup in the ED was significant for evidence of an acute appendicitis.  She was taken to the OR for an anticipated laparoscopic appendectomy with Dr. Perez.  Procedure itself was without incident however intubation was difficult, and required multiple attempts by different providers to successfully intubate.  She was found to have masslike inflammation at the base of the appendix, and the patient was transferred to recovery.  Based on the difficulty of her intubation there was concern that she might have airway issues on extubation so she was transferred to [facility].  Here she was evaluated by the attending intensivist Dr. Siddiqui who evaluated her airway and was able to successfully extubate her.    On my examination the patient states she's feeling good.  No abd pain while at rest though does have a little pain with movement.  No BM but did pass flatus.  Eating well no N or V    DW pt's family at bedside.  All questions answered      Review of Systems  Review of Systems   Constitutional:  Negative for chills and fever.   HENT:  Positive for sore throat.    Respiratory:  Negative for cough and shortness of breath.    Cardiovascular:  Negative for chest pain, palpitations and leg swelling.   Gastrointestinal:  Positive for abdominal pain and constipation. Negative for diarrhea, nausea and vomiting.   Genitourinary:  Negative for dysuria and urgency.   Musculoskeletal:  Negative for back pain.   Neurological:  Negative for dizziness, loss of  consciousness and headaches.       Past Medical History   has a past medical history of History of myocardial infarction, Hyperlipidemia, Hypertension, and Thyroid disease.    Surgical History   has a past surgical history that includes thyroidectomy total; cholecystectomy; and pr lap,appendectomy (N/A, 3/7/2024).    Family History  family history includes Alzheimer's Disease in her mother; Heart Disease (age of onset: 70) in her father.    Social History   reports that she has never smoked. She has never used smokeless tobacco. She reports that she does not currently use alcohol. She reports that she does not use drugs.    Medications  Prior to Admission Medications   Prescriptions Last Dose Informant Patient Reported? Taking?   FLUoxetine (PROZAC) 10 MG Cap 3/7/2024 at AM Patient No No   Sig: TAKE 1 CAPSULE DAILY   Patient taking differently: Take 10 mg by mouth every day.   Multiple Vitamins-Minerals (HAIR SKIN & NAILS) Tab 3/6/2024 at AM Patient Yes No   Sig: Take 1 Tablet by mouth every day.   SYNTHROID 200 MCG Tab 3/7/2024 at AM Patient No No   Sig: TAKE 1 TABLET EVERY MORNINGON AN EMPTY STOMACH   Patient taking differently: Take 200 mcg by mouth every morning on an empty stomach.   amLODIPine (NORVASC) 5 MG Tab 3/6/2024 at PM Patient Yes No   Sig: Take 5 mg by mouth every day.   aspirin (ASA) 81 MG Chew Tab chewable tablet 3/6/2024 at PM Patient Yes No   Sig: Chew 81 mg every day.   atorvastatin (LIPITOR) 80 MG tablet 3/7/2024 at AM Patient Yes Yes   Sig: Take 80 mg by mouth every day.   carvedilol (COREG) 6.25 MG Tab 3/7/2024 at AM Patient No No   Sig: Take 1 Tablet by mouth 2 times a day with meals.   diclofenac sodium 1 % Gel 3/7/2024 at AM Patient No No   Sig: Apply to affected area daily prn.      Facility-Administered Medications: None       Allergies  Allergies   Allergen Reactions    Inapsine [Droperidol] Anaphylaxis       Physical Exam  Temp:  [36.1 °C (96.9 °F)] 36.1 °C (96.9 °F)  Pulse:  [45-90]  74  Resp:  [15-42] 42  BP: ()/(47-70) 88/47  SpO2:  [89 %-98 %] 95 %    Physical Exam  Constitutional:       General: She is not in acute distress.     Appearance: Normal appearance. She is well-developed. She is obese. She is not diaphoretic.   HENT:      Head: Normocephalic and atraumatic.      Mouth/Throat:      Mouth: Mucous membranes are moist.      Pharynx: Oropharynx is clear.   Neck:      Vascular: No JVD.   Cardiovascular:      Rate and Rhythm: Normal rate and regular rhythm.      Heart sounds: No murmur heard.  Pulmonary:      Effort: Pulmonary effort is normal. No respiratory distress.      Breath sounds: No stridor. No wheezing or rales.   Abdominal:      Palpations: Abdomen is soft.      Tenderness: There is no abdominal tenderness. There is no guarding or rebound.      Comments: Surgical site looks good   Musculoskeletal:      Cervical back: No rigidity.   Skin:     General: Skin is warm and dry.      Findings: No rash.   Neurological:      Mental Status: She is oriented to person, place, and time.   Psychiatric:         Mood and Affect: Mood normal.         Behavior: Behavior normal.         Thought Content: Thought content normal.         Fluids  Date 03/10/24 0700 - 03/11/24 0659   Shift 3407-1815 3673-9515 0910-8835 24 Hour Total   INTAKE   I.V. 1260.1   1260.1   Shift Total 1260.1   1260.1   OUTPUT   Urine 425   425   Shift Total 425   425   Weight (kg) 109 109 109 109       Laboratory  Recent Labs     03/08/24  0342 03/09/24  0402 03/10/24  0354   WBC 15.2* 17.3* 15.8*   RBC 4.11* 3.99* 4.06*   HEMOGLOBIN 12.9 12.2 12.1   HEMATOCRIT 37.9 36.8* 37.5   MCV 92.2 92.2 92.4   MCH 31.4 30.6 29.8   MCHC 34.0 33.2 32.3   RDW 42.5 42.3 43.5   PLATELETCT 380 391 428   MPV 9.8 10.1 10.3     Recent Labs     03/08/24  0342 03/09/24  0402 03/10/24  0354   SODIUM 138 137 140   POTASSIUM 3.8 3.8 4.3   CHLORIDE 104 104 104   CO2 25 24 23   GLUCOSE 186* 169* 153*   BUN 16 27* 33*   CREATININE 1.14 1.10 0.88    CALCIUM 8.7 8.8 8.6              Recent Labs     03/07/24  2227 03/09/24  0402   TRIGLYCERIDE 105 121        Imaging  DX-CHEST-PORTABLE (1 VIEW)   Final Result      Cardiomegaly.      Linear bibasilar atelectasis.      DX-CHEST-PORTABLE (1 VIEW)   Final Result      Intubation. Lungs appear clear.          Assessment/Plan  * Acute appendicitis- (present on admission)  Assessment & Plan  S/p lap appy  ?mass at base of appendix; pathology pending  Tolerating diet  No BM but has had flatus  Mobilize  Watch for signs of return of bowel function    Difficult airway for intubation  Assessment & Plan  Succesfully extubated and doing well: normal resp exam, no stridor, OP benign  Pt without symptoms  DW pt and family at length to make suer she warns any future surgeions/anethesiologists    Respiratory failure with hypoxia (HCC)  Assessment & Plan  Intubated for OR  Still on 2 LNC  Pt with Hx of LIDYA; ?obesity hypoventilations syndrome  CXR benign  Mobilize  O2/RT protocols    LIDYA (obstructive sleep apnea)- (present on admission)  Assessment & Plan  Cont home CPAP    Coronary artery disease due to calcified coronary lesion: History of small diagonal occlusion without other significant disease.- (present on admission)  Assessment & Plan  statin    History of total thyroidectomy- (present on admission)  Assessment & Plan  Cont replacement    GERD (gastroesophageal reflux disease)- (present on admission)  Assessment & Plan  Asymptomatic at present    Benign essential HTN- (present on admission)  Assessment & Plan  As outpt is on   -amlodipine 5  -coreg 6.25  Pressures soft so meds are on hold           Immediate family member

## 2024-03-13 ENCOUNTER — APPOINTMENT (OUTPATIENT)
Age: 69
End: 2024-03-13
Payer: COMMERCIAL

## 2024-03-13 PROCEDURE — 99024 POSTOP FOLLOW-UP VISIT: CPT

## 2024-05-08 ENCOUNTER — APPOINTMENT (OUTPATIENT)
Age: 69
End: 2024-05-08
Payer: COMMERCIAL

## 2024-05-08 PROCEDURE — 99024 POSTOP FOLLOW-UP VISIT: CPT

## 2024-05-14 ENCOUNTER — APPOINTMENT (OUTPATIENT)
Age: 69
End: 2024-05-14

## 2024-05-22 ENCOUNTER — APPOINTMENT (OUTPATIENT)
Age: 69
End: 2024-05-22

## 2024-09-04 NOTE — ED PROVIDER NOTE - PRO INTERPRETER NEED 2
09/04/2024     Edu Al  [id number]  1966    PRE-OPERATIVE DIAGNOSIS  1. Tympanic membrane perforation, right    2. Perforation of right tympanic membrane         POST-OPERATIVE DIAGNOSIS  1. Tympanic membrane perforation, right    2. Perforation of right tympanic membrane         PROCEDURES PERFORMED  Right transcanal tympanoplasty without ossicular chain reconstruction  [place] of temporalis fascia graft from separate incision    SURGEON: Rohit Valderrama MD    ASSISTANT: None    ANESTHESIA: General    COMPLICATIONS: None    BLOOD LOSS: 10 cc    SPECIMENS  None    DISPOSITION  PACU    OPERATIVE FINDINGS  30% inferior perforation  Malleus: intact, medialized; Incus: intact; Stapes intact; RWR: present  Middle ear: aerated  Grafting using perichondrium and thinned cartilage    INDICATIONS  Edu Al is a 57 y.o. female who was seen in clinic for evaluation of the above diagnosis. They were noted to have a perforation of the tympanic membrane with associated hearing loss. We did discuss her underlying ETD and possibility of re-rupture or not healing. Based on clinical assessment, the following procedures were discussed as an option for treatment. After risks, benefits, and alternatives were discussed the patient decided to proceed.     PROCEDURE IN DETAIL  The patient was seen in the pre-operative holding area, and consent was signed. They were wheeled into the operating room and placed supine on the table. Anesthesia was induced and an endotracheal tube was placed. The bed was turned 180 degrees. The patient was secured to the bed and padded appropriately. The right ear was confirmed by pre-operative marking and audiogram. The post-auricular and tragal regions were injected with 1% Lidocaine with 1:100,000 epinephrine. The ear was prepped with betadine. she was draped in a sterile fashion     A speculum was inserted. Transcanal injections were performed with local anesthesia. The ear was cleaned. The  perforation was noted as above. The margins of the perforation were freshened. A posteriorly based tympanomeatal flap was elevated from lateral to medial. The annulus was elevated out of the groove. The middle ear space was entered at the inferior mesotympanum. Annulus was elevated superiorly and inferiorly. Chorda tympani was identified and preserved. The ossicular chain was inspected as below. The middle ear was aerated without adhesions.      An incision was made to harvest temporalis fascia graft. An incision was made on the posterior aspect of the tragus. I dissected on the posterior aspect of tragal cartilage and cut through the cartilage in order to harvest a 1 cm graft with perichondrium on both sides. This was closed with 5-0 gut suture. I removed the perichondrium off of both sides and cut the cartilage to 2 mm. The fascia graft was pressed, dried, and trimmed to size.    The cartilage followed by fascia graft was placed in an underlay fashion relative to the perforation. Gelfoam was placed in the middle ear to bolster and support the graft. The edges of the perforation were nicely approximated to the graft at all sides. Gelfoam was placed lateral to the tympanic membrane for further support. Antibiotic ointment was applied to the lateral external ear and incision. The patient's ear was cleaned.     This marked the end of the procedure. The patient was turned back over to the Anesthesia team.  They were awoken and transported back to the PACU in stable condition. Counts were correct. I performed the entire procedure.     English

## 2024-09-24 ENCOUNTER — APPOINTMENT (OUTPATIENT)
Age: 69
End: 2024-09-24
Payer: SELF-PAY

## 2024-09-24 PROCEDURE — D6011: CPT | Mod: NC

## 2024-09-24 PROCEDURE — 99213 OFFICE O/P EST LOW 20 MIN: CPT

## 2024-10-08 ENCOUNTER — APPOINTMENT (OUTPATIENT)
Age: 69
End: 2024-10-08

## 2024-10-29 ENCOUNTER — APPOINTMENT (OUTPATIENT)
Age: 69
End: 2024-10-29

## 2024-11-11 ENCOUNTER — APPOINTMENT (OUTPATIENT)
Dept: MAMMOGRAPHY | Facility: CLINIC | Age: 69
End: 2024-11-11

## 2024-11-11 ENCOUNTER — APPOINTMENT (OUTPATIENT)
Dept: ULTRASOUND IMAGING | Facility: CLINIC | Age: 69
End: 2024-11-11
Payer: COMMERCIAL

## 2024-11-11 PROCEDURE — A4648: CPT | Mod: 59

## 2024-11-11 PROCEDURE — 19081 BX BREAST 1ST LESION STRTCTC: CPT | Mod: LT,59

## 2024-11-11 PROCEDURE — 19083 BX BREAST 1ST LESION US IMAG: CPT | Mod: LT

## 2024-11-11 PROCEDURE — 19084Z: CUSTOM | Mod: RT

## 2024-11-11 PROCEDURE — 77065 DX MAMMO INCL CAD UNI: CPT | Mod: RT
